# Patient Record
Sex: MALE | Race: WHITE
[De-identification: names, ages, dates, MRNs, and addresses within clinical notes are randomized per-mention and may not be internally consistent; named-entity substitution may affect disease eponyms.]

---

## 2017-12-29 ENCOUNTER — HOSPITAL ENCOUNTER (EMERGENCY)
Dept: HOSPITAL 96 - M.ERS | Age: 77
Discharge: HOME | End: 2017-12-29
Payer: COMMERCIAL

## 2017-12-29 VITALS — BODY MASS INDEX: 33.33 KG/M2 | WEIGHT: 225 LBS | HEIGHT: 69 IN

## 2017-12-29 VITALS — DIASTOLIC BLOOD PRESSURE: 91 MMHG | SYSTOLIC BLOOD PRESSURE: 180 MMHG

## 2017-12-29 DIAGNOSIS — E78.00: ICD-10-CM

## 2017-12-29 DIAGNOSIS — E11.9: ICD-10-CM

## 2017-12-29 DIAGNOSIS — R42: Primary | ICD-10-CM

## 2017-12-29 LAB
ABSOLUTE BASOPHILS: 0 THOU/UL (ref 0–0.2)
ABSOLUTE EOSINOPHILS: 0.2 THOU/UL (ref 0–0.7)
ABSOLUTE MONOCYTES: 0.5 THOU/UL (ref 0–1.2)
ALBUMIN SERPL-MCNC: 3.9 G/DL (ref 3.4–5)
ALP SERPL-CCNC: 87 U/L (ref 46–116)
ALT SERPL-CCNC: 28 U/L (ref 30–65)
ANION GAP SERPL CALC-SCNC: 9 MMOL/L (ref 7–16)
AST SERPL-CCNC: 19 U/L (ref 15–37)
BASOPHILS NFR BLD AUTO: 0.5 %
BILIRUB SERPL-MCNC: 0.7 MG/DL
BUN SERPL-MCNC: 18 MG/DL (ref 7–18)
CALCIUM SERPL-MCNC: 9.2 MG/DL (ref 8.5–10.1)
CHLORIDE SERPL-SCNC: 99 MMOL/L (ref 98–107)
CO2 SERPL-SCNC: 27 MMOL/L (ref 21–32)
CREAT SERPL-MCNC: 1 MG/DL (ref 0.6–1.3)
EOSINOPHIL NFR BLD: 2.1 %
GLUCOSE SERPL-MCNC: 227 MG/DL (ref 70–99)
GRANULOCYTES NFR BLD MANUAL: 78.3 %
HCT VFR BLD CALC: 41.8 % (ref 42–52)
HGB BLD-MCNC: 14.5 GM/DL (ref 14–18)
LYMPHOCYTES # BLD: 0.9 THOU/UL (ref 0.8–5.3)
LYMPHOCYTES NFR BLD AUTO: 12.2 %
MCH RBC QN AUTO: 30.5 PG (ref 26–34)
MCHC RBC AUTO-ENTMCNC: 34.6 G/DL (ref 28–37)
MCV RBC: 88.1 FL (ref 80–100)
MONOCYTES NFR BLD: 6.9 %
MPV: 7.3 FL. (ref 7.2–11.1)
NEUTROPHILS # BLD: 5.7 THOU/UL (ref 1.6–8.1)
NUCLEATED RBCS: 0 /100WBC
PLATELET COUNT*: 215 THOU/UL (ref 150–400)
POTASSIUM SERPL-SCNC: 4.2 MMOL/L (ref 3.5–5.1)
PROT SERPL-MCNC: 7.2 G/DL (ref 6.4–8.2)
RBC # BLD AUTO: 4.75 MIL/UL (ref 4.5–6)
RDW-CV: 12.7 % (ref 10.5–14.5)
SODIUM SERPL-SCNC: 135 MMOL/L (ref 136–145)
TROPONIN-I LEVEL: <0.06 NG/ML (ref ?–0.06)
WBC # BLD AUTO: 7.3 THOU/UL (ref 4–11)

## 2018-02-06 ENCOUNTER — HOSPITAL ENCOUNTER (EMERGENCY)
Dept: HOSPITAL 96 - M.ERS | Age: 78
Discharge: HOME | End: 2018-02-06
Payer: COMMERCIAL

## 2018-02-06 VITALS — SYSTOLIC BLOOD PRESSURE: 178 MMHG | DIASTOLIC BLOOD PRESSURE: 70 MMHG

## 2018-02-06 VITALS — HEIGHT: 69 IN | WEIGHT: 214 LBS | BODY MASS INDEX: 31.7 KG/M2

## 2018-02-06 DIAGNOSIS — R42: Primary | ICD-10-CM

## 2018-02-06 DIAGNOSIS — E78.00: ICD-10-CM

## 2018-02-06 DIAGNOSIS — E11.9: ICD-10-CM

## 2018-02-06 DIAGNOSIS — Z86.73: ICD-10-CM

## 2018-02-06 DIAGNOSIS — Z88.2: ICD-10-CM

## 2018-02-06 LAB
ABSOLUTE BASOPHILS: 0 THOU/UL (ref 0–0.2)
ABSOLUTE EOSINOPHILS: 0.2 THOU/UL (ref 0–0.7)
ABSOLUTE MONOCYTES: 0.4 THOU/UL (ref 0–1.2)
ALBUMIN SERPL-MCNC: 3.5 G/DL (ref 3.4–5)
ALP SERPL-CCNC: 82 U/L (ref 46–116)
ALT SERPL-CCNC: 24 U/L (ref 30–65)
ANION GAP SERPL CALC-SCNC: 7 MMOL/L (ref 7–16)
AST SERPL-CCNC: 13 U/L (ref 15–37)
BASOPHILS NFR BLD AUTO: 0.8 %
BILIRUB SERPL-MCNC: 0.4 MG/DL
BILIRUB UR-MCNC: NEGATIVE MG/DL
BUN SERPL-MCNC: 18 MG/DL (ref 7–18)
CALCIUM SERPL-MCNC: 9.1 MG/DL (ref 8.5–10.1)
CHLORIDE SERPL-SCNC: 103 MMOL/L (ref 98–107)
CO2 SERPL-SCNC: 26 MMOL/L (ref 21–32)
COLOR UR: (no result)
CREAT SERPL-MCNC: 1 MG/DL (ref 0.6–1.3)
EOSINOPHIL NFR BLD: 3.5 %
GLUCOSE SERPL-MCNC: 245 MG/DL (ref 70–99)
GRANULOCYTES NFR BLD MANUAL: 78.6 %
HCT VFR BLD CALC: 40 % (ref 42–52)
HGB BLD-MCNC: 13.9 GM/DL (ref 14–18)
INR PPP: 1
KETONES UR STRIP-MCNC: NEGATIVE MG/DL
LIPASE: 260 U/L (ref 73–393)
LYMPHOCYTES # BLD: 0.5 THOU/UL (ref 0.8–5.3)
LYMPHOCYTES NFR BLD AUTO: 10 %
MCH RBC QN AUTO: 30.5 PG (ref 26–34)
MCHC RBC AUTO-ENTMCNC: 34.7 G/DL (ref 28–37)
MCV RBC: 88 FL (ref 80–100)
MONOCYTES NFR BLD: 7.1 %
MPV: 7.3 FL. (ref 7.2–11.1)
NEUTROPHILS # BLD: 4.1 THOU/UL (ref 1.6–8.1)
NT-PRO BRAIN NAT PEPTIDE: 186 PG/ML (ref ?–300)
NUCLEATED RBCS: 0 /100WBC
PLATELET COUNT*: 197 THOU/UL (ref 150–400)
POTASSIUM SERPL-SCNC: 4.6 MMOL/L (ref 3.5–5.1)
PROT SERPL-MCNC: 6.6 G/DL (ref 6.4–8.2)
PROT UR QL STRIP: NEGATIVE
PROTHROMBIN TIME: 10.2 SECONDS (ref 9.2–11.5)
RBC # BLD AUTO: 4.55 MIL/UL (ref 4.5–6)
RBC # UR STRIP: NEGATIVE /UL
RDW-CV: 13.3 % (ref 10.5–14.5)
SODIUM SERPL-SCNC: 136 MMOL/L (ref 136–145)
SP GR UR STRIP: 1.01 (ref 1–1.03)
TROPONIN-I LEVEL: <0.06 NG/ML (ref ?–0.06)
URINE CLARITY: CLEAR
URINE GLUCOSE-RANDOM: (no result)
URINE LEUKOCYTES-REFLEX: NEGATIVE
URINE NITRITE-REFLEX: NEGATIVE
UROBILINOGEN UR STRIP-ACNC: 0.2 E.U./DL (ref 0.2–1)
WBC # BLD AUTO: 5.2 THOU/UL (ref 4–11)

## 2018-02-06 NOTE — EKG
[place] [address]
Phone:  [phone]                     ELECTROCARDIOGRAM REPORT      
_______________________________________________________________________________
 
Name:       ANALIANANCY ROMERO          Room:                      REG ER  
M.R.#:  [id number]      Account #:      [id number]  
Admission:  18     Attend Phys:                         
Discharge:               Date of Birth:  [birth year]  
         Report #: [id number]
    [id number]
_______________________________________________________________________________
THIS REPORT FOR:  //name//                      
 
                         [facility] ED
                                       
Test Date:    2018               Test Time:    10:11:00
Pat Name:     NANCY HELMS      Department:   
Patient ID:   [id number]            Room:          
Gender:       M                        Technician:   MS
:          1940               Requested By: Tae Esquivel
Order Number: [id number] MD:   Ronald Cameron
                                 Measurements
Intervals                              Axis          
Rate:         60                       P:            48
NY:           195                      QRS:          6
QRSD:         93                       T:            37
QT:           410                                    
QTc:          410                                    
                           Interpretive Statements
Sinus rhythm
Compared to ECG 2018 14:10:28
No significant changes
 
Electronically Signed On 2018 16:33:32 CST by Ronald Cameron
[email or web address]
 
 
 
 
 
 
 
 
 
 
 
 
 
 
 
 
 
 
 
  <ELECTRONICALLY SIGNED>
                                           By: Ronald Cameron MD, [facility]   
  18     1633
D: 18 1011   _____________________________________
T: 18 1011   Ronald Cameron MD, FACC     /EPI

## 2018-02-08 ENCOUNTER — HOSPITAL ENCOUNTER (INPATIENT)
Dept: HOSPITAL 96 - M.ERS | Age: 78
LOS: 4 days | Discharge: HOME | DRG: 74 | End: 2018-02-12
Attending: INTERNAL MEDICINE | Admitting: INTERNAL MEDICINE
Payer: COMMERCIAL

## 2018-02-08 VITALS — BODY MASS INDEX: 17.48 KG/M2 | HEIGHT: 69.02 IN | WEIGHT: 118 LBS

## 2018-02-08 VITALS — SYSTOLIC BLOOD PRESSURE: 184 MMHG | DIASTOLIC BLOOD PRESSURE: 93 MMHG

## 2018-02-08 VITALS — DIASTOLIC BLOOD PRESSURE: 84 MMHG | SYSTOLIC BLOOD PRESSURE: 167 MMHG

## 2018-02-08 VITALS — SYSTOLIC BLOOD PRESSURE: 128 MMHG | DIASTOLIC BLOOD PRESSURE: 70 MMHG

## 2018-02-08 VITALS — DIASTOLIC BLOOD PRESSURE: 60 MMHG | SYSTOLIC BLOOD PRESSURE: 145 MMHG

## 2018-02-08 VITALS — DIASTOLIC BLOOD PRESSURE: 66 MMHG | SYSTOLIC BLOOD PRESSURE: 130 MMHG

## 2018-02-08 VITALS — DIASTOLIC BLOOD PRESSURE: 79 MMHG | SYSTOLIC BLOOD PRESSURE: 163 MMHG

## 2018-02-08 VITALS — DIASTOLIC BLOOD PRESSURE: 73 MMHG | SYSTOLIC BLOOD PRESSURE: 122 MMHG

## 2018-02-08 DIAGNOSIS — Z79.84: ICD-10-CM

## 2018-02-08 DIAGNOSIS — I10: ICD-10-CM

## 2018-02-08 DIAGNOSIS — E78.00: ICD-10-CM

## 2018-02-08 DIAGNOSIS — Z88.2: ICD-10-CM

## 2018-02-08 DIAGNOSIS — H81.399: ICD-10-CM

## 2018-02-08 DIAGNOSIS — Z79.899: ICD-10-CM

## 2018-02-08 DIAGNOSIS — E44.1: ICD-10-CM

## 2018-02-08 DIAGNOSIS — E11.43: Primary | ICD-10-CM

## 2018-02-08 DIAGNOSIS — E11.65: ICD-10-CM

## 2018-02-08 DIAGNOSIS — Z79.82: ICD-10-CM

## 2018-02-08 DIAGNOSIS — Z87.891: ICD-10-CM

## 2018-02-08 DIAGNOSIS — Z85.46: ICD-10-CM

## 2018-02-08 LAB
ABSOLUTE BASOPHILS: 0 THOU/UL (ref 0–0.2)
ABSOLUTE EOSINOPHILS: 0.2 THOU/UL (ref 0–0.7)
ABSOLUTE MONOCYTES: 0.4 THOU/UL (ref 0–1.2)
ALBUMIN SERPL-MCNC: 3.6 G/DL (ref 3.4–5)
ALP SERPL-CCNC: 81 U/L (ref 46–116)
ALT SERPL-CCNC: 23 U/L (ref 30–65)
ANION GAP SERPL CALC-SCNC: 8 MMOL/L (ref 7–16)
APTT BLD: 26.1 SECONDS (ref 25–31.3)
AST SERPL-CCNC: 11 U/L (ref 15–37)
BASOPHILS NFR BLD AUTO: 0.8 %
BILIRUB SERPL-MCNC: 0.5 MG/DL
BILIRUB UR-MCNC: NEGATIVE MG/DL
BUN SERPL-MCNC: 25 MG/DL (ref 7–18)
CALCIUM SERPL-MCNC: 9.3 MG/DL (ref 8.5–10.1)
CHLORIDE SERPL-SCNC: 100 MMOL/L (ref 98–107)
CO2 SERPL-SCNC: 28 MMOL/L (ref 21–32)
COLOR UR: YELLOW
CREAT SERPL-MCNC: 1.1 MG/DL (ref 0.6–1.3)
EOSINOPHIL NFR BLD: 4.8 %
GLUCOSE SERPL-MCNC: 300 MG/DL (ref 70–99)
GRANULOCYTES NFR BLD MANUAL: 70.4 %
HCT VFR BLD CALC: 40.3 % (ref 42–52)
HGB BLD-MCNC: 13.7 GM/DL (ref 14–18)
INR PPP: 1.1
KETONES UR STRIP-MCNC: NEGATIVE MG/DL
LYMPHOCYTES # BLD: 0.8 THOU/UL (ref 0.8–5.3)
LYMPHOCYTES NFR BLD AUTO: 15.4 %
MCH RBC QN AUTO: 30 PG (ref 26–34)
MCHC RBC AUTO-ENTMCNC: 34.1 G/DL (ref 28–37)
MCV RBC: 88.1 FL (ref 80–100)
MONOCYTES NFR BLD: 8.6 %
MPV: 7.1 FL. (ref 7.2–11.1)
NEUTROPHILS # BLD: 3.6 THOU/UL (ref 1.6–8.1)
NT-PRO BRAIN NAT PEPTIDE: 132 PG/ML (ref ?–300)
NUCLEATED RBCS: 0 /100WBC
PLATELET COUNT*: 208 THOU/UL (ref 150–400)
POTASSIUM SERPL-SCNC: 4.5 MMOL/L (ref 3.5–5.1)
PROT SERPL-MCNC: 6.9 G/DL (ref 6.4–8.2)
PROT UR QL STRIP: NEGATIVE
PROTHROMBIN TIME: 10.3 SECONDS (ref 9.2–11.5)
RBC # BLD AUTO: 4.57 MIL/UL (ref 4.5–6)
RBC # UR STRIP: NEGATIVE /UL
RDW-CV: 13.2 % (ref 10.5–14.5)
SODIUM SERPL-SCNC: 136 MMOL/L (ref 136–145)
SP GR UR STRIP: 1.02 (ref 1–1.03)
TROPONIN-I LEVEL: <0.06 NG/ML (ref ?–0.06)
URINE CLARITY: CLEAR
URINE GLUCOSE-RANDOM: (no result)
URINE LEUKOCYTES-REFLEX: NEGATIVE
URINE NITRITE-REFLEX: NEGATIVE
UROBILINOGEN UR STRIP-ACNC: 0.2 E.U./DL (ref 0.2–1)
WBC # BLD AUTO: 5.1 THOU/UL (ref 4–11)

## 2018-02-08 NOTE — EKG
[place] [address]
Phone:  [phone]                     ELECTROCARDIOGRAM REPORT      
_______________________________________________________________________________
 
Name:       NANCY HELMS          Room:           [address]    ADM [place]  
.R.#:  [id number]      Account #:      [id number]  
Admission:  18     Attend Phys:    Jorden Reyes MD 
Discharge:               Date of Birth:  [birth year]  
         Report #: [id number]
    [id number]
_______________________________________________________________________________
THIS REPORT FOR:  //name//                      
 
                         [facility] ED
                                       
Test Date:    2018               Test Time:    04:19:05
Pat Name:     NANCY HELMS      Department:   
Patient ID:   [id number]            Room:         [facility]
Gender:                               Technician:   VINCE RAMIREZ
:          1940               Requested By: Jb Caicedo
Order Number: [id number] MD:   Ronald Cameron
                                 Measurements
Intervals                              Axis          
Rate:         59                       P:            73
MS:           196                      QRS:          3
QRSD:         95                       T:            31
QT:           426                                    
QTc:          422                                    
                           Interpretive Statements
Sinus rhythm
Low voltage, precordial leads
Compared to ECG 2018 10:11:00
Low QRS voltage now present
 
Electronically Signed On 2018 11:28:50 CST by Ronald Cameron
[email or web address]
 
 
 
 
 
 
 
 
 
 
 
 
 
 
 
 
 
 
  <ELECTRONICALLY SIGNED>
                                           By: Ronald Cameron MD, [facility]   
  18     1128
D: 18 0419   _____________________________________
T: 18 0419   Ronald Cameron MD, [facility]     /EPI

## 2018-02-09 VITALS — SYSTOLIC BLOOD PRESSURE: 126 MMHG | DIASTOLIC BLOOD PRESSURE: 67 MMHG

## 2018-02-09 VITALS — DIASTOLIC BLOOD PRESSURE: 81 MMHG | SYSTOLIC BLOOD PRESSURE: 151 MMHG

## 2018-02-09 VITALS — SYSTOLIC BLOOD PRESSURE: 111 MMHG | DIASTOLIC BLOOD PRESSURE: 52 MMHG

## 2018-02-09 VITALS — SYSTOLIC BLOOD PRESSURE: 123 MMHG | DIASTOLIC BLOOD PRESSURE: 66 MMHG

## 2018-02-09 VITALS — SYSTOLIC BLOOD PRESSURE: 119 MMHG | DIASTOLIC BLOOD PRESSURE: 64 MMHG

## 2018-02-09 LAB
EST. AVERAGE GLUCOSE BLD GHB EST-MCNC: 232 MG/DL
GLYCOHEMOGLOBIN (HGB A1C): 9.7 % (ref 4.8–5.6)

## 2018-02-09 NOTE — CON
[address]  [address]                    CONSULTATION                  
_______________________________________________________________________________
 
Name:       ANALIANANCY ROMERO          Room:           [address] [place]  
.#:  [id number]      Account #:      [id number]  
Admission:  02/08/18     Attend Phys:    Jorden Reyes MD 
Discharge:               Date of Birth:  [birth year]  
         Report #: [id number]
                                                                     [id number]  
_______________________________________________________________________________
THIS REPORT FOR:  //name//                      
 
CC: ZECHARIAH physician/PCP
    Jorden Reyes
 
DATE OF SERVICE:  02/08/2018
 
 
HISTORY OF PRESENT ILLNESS:  This is a 77-year-old male patient who was seen by
me on his last admission.  This patient had a posterior cerebral artery stroke. 
He was partly worked up by a cardiologist and was going to be worked up as an
outpatient, but I do not know whether he followed up with them or not.  He has
this history of vertigo for some time.  This predated his stroke.  He had two
episodes of pretty severe vertigo.  It was associated with nausea.  He did not
have any clear cut seizure activity.
 
REVIEW OF SYSTEMS:  Indicates that this patient's dizziness predate this
patient's stroke.  This patient also has a prior tumor that has been present on
his imaging studies.  He has not had any ENT evaluation.  He was going to go to
an ophthalmologist, but he has not been there yet.  This was his relevant
14-point review of systems.
 
PAST MEDICAL HISTORY:  Positive for stroke, but his dizziness predated that.
 
FAMILY HISTORY:  Noncontributory.
 
SOCIAL HISTORY:  He does not smoke.
 
PHYSICAL EXAMINATION:  
NEUROLOGICAL:  The patient's examination was carried out.  His neurological
examination is that alert, responsive, able to follow simple and complex
command.  His speech, concentration, fund of knowledge and memory is at his
baseline.  Cranial nerve examination 2 through 12 still indicate hemianopsia. 
He moves symmetrically.  There is no meningeal sign.  
NECK:  There is no carotid bruit.  
CARDIOVASCULAR:  No atrial fibrillation has been documented.  
LUNGS:  No respiratory difficulty or rhonchi.  
VITAL SIGNS:  This patient's blood pressure is 184/93, respirations 18, pulse is
63 and temperature is 98.1.
EXTREMITIES:  There is no edema or cyanosis.  
 
LABORATORY DATA:  His white count is 5.1.  
 
RADIOLOGICAL DATA:  His imaging study was reviewed and he had a CT and MRI and
that showed pretty much unchanged.
 
 
 
 
[place] [address]                    CONSULTATION                  
_______________________________________________________________________________
 
Name:       MARY ANNPHILIPPCHAUNCEYNANCY HEATHER          Room:           [address]#:  [id number]      Account #:      [id number]  
Admission:  02/08/18     Attend Phys:    Jorden Reyes MD 
Discharge:               Date of Birth:  [birth year]  
         Report #: [id number]
                                                                     [id number]  
_______________________________________________________________________________
IMPRESSION:
1.  It is unlikely that the patient's dizziness is associated with present
symptomatology.  I think we need to look for the other causes.  He needs an ENT
evaluation and he also needs cardiac evaluation.
2.  He is a diabetic, so he can get autonomic neuropathy and his diabetes does
not appear to be well controlled, but the symptoms are not very typical for
that.
3.  He does have tumor.  He can develop seizures and that is a possible that he
can have a vertiginous seizure.
 
RECOMMENDATIONS:  So far, the workup has been non-rewarding in this patient, I
am going to go ahead and do an EEG in this patient.  He just had a CT chest
protocol, so I do not think we should do CT angio of the head and neck.  I will
check an EEG, but tried to get, I think we should give him a trial with
Lamictal.  He should have Cardiology followup and should also have an ENT
followup because I do not think any ENT physician comes here anymore.
 
More than 50 minutes of time was spent taking care of this patient today and
majority of that time was spent counseling this patient on above matters.  Dr. Fisher will follow up this patient with you from tomorrow.
 
 
 
 
 
 
 
 
 
 
 
 
 
 
 
 
 
 
 
 
 
 
 
 
<ELECTRONICALLY SIGNED>
                                        By:  Bill Persaud MD             
02/09/18     1039
D: 02/08/18 1629_______________________________________
T: 02/09/18 0256Bill Persaud MD                /nt

## 2018-02-10 VITALS — DIASTOLIC BLOOD PRESSURE: 57 MMHG | SYSTOLIC BLOOD PRESSURE: 114 MMHG

## 2018-02-10 VITALS — SYSTOLIC BLOOD PRESSURE: 130 MMHG | DIASTOLIC BLOOD PRESSURE: 80 MMHG

## 2018-02-10 VITALS — DIASTOLIC BLOOD PRESSURE: 67 MMHG | SYSTOLIC BLOOD PRESSURE: 143 MMHG

## 2018-02-10 VITALS — DIASTOLIC BLOOD PRESSURE: 69 MMHG | SYSTOLIC BLOOD PRESSURE: 122 MMHG

## 2018-02-10 VITALS — DIASTOLIC BLOOD PRESSURE: 71 MMHG | SYSTOLIC BLOOD PRESSURE: 128 MMHG

## 2018-02-10 LAB
ANION GAP SERPL CALC-SCNC: 8 MMOL/L (ref 7–16)
BUN SERPL-MCNC: 26 MG/DL (ref 7–18)
CALCIUM SERPL-MCNC: 9.1 MG/DL (ref 8.5–10.1)
CHLORIDE SERPL-SCNC: 104 MMOL/L (ref 98–107)
CO2 SERPL-SCNC: 27 MMOL/L (ref 21–32)
CREAT SERPL-MCNC: 1.1 MG/DL (ref 0.6–1.3)
GLUCOSE SERPL-MCNC: 158 MG/DL (ref 70–99)
HCT VFR BLD CALC: 38.5 % (ref 42–52)
HGB BLD-MCNC: 13.1 GM/DL (ref 14–18)
MAGNESIUM SERPL-MCNC: 1.8 MG/DL (ref 1.8–2.4)
MCH RBC QN AUTO: 30 PG (ref 26–34)
MCHC RBC AUTO-ENTMCNC: 34 G/DL (ref 28–37)
MCV RBC: 88.3 FL (ref 80–100)
MPV: 7.3 FL. (ref 7.2–11.1)
PLATELET COUNT*: 207 THOU/UL (ref 150–400)
POTASSIUM SERPL-SCNC: 4.3 MMOL/L (ref 3.5–5.1)
RBC # BLD AUTO: 4.36 MIL/UL (ref 4.5–6)
RDW-CV: 13.3 % (ref 10.5–14.5)
SODIUM SERPL-SCNC: 139 MMOL/L (ref 136–145)
WBC # BLD AUTO: 5.2 THOU/UL (ref 4–11)

## 2018-02-10 NOTE — CON
[address]  [address]                    CONSULTATION                  
_______________________________________________________________________________
 
Name:       ANALIANANCY HEATHER          Room:           [address] [place]  
M.R.#:  [id number]      Account #:      [id number]  
Admission:  02/08/18     Attend Phys:    Jorden Reyes MD 
Discharge:               Date of Birth:  [birth year]  
         Report #: [id number]
                                                                     [id number]  
_______________________________________________________________________________
THIS REPORT FOR:  //name//                      
 
CC: Reji Cloud DO
    [facility] physician/PCP
    Jorden Reyes
 
DATE OF SERVICE:  02/09/2018
 
 
TYPE OF REPORT:  Cardiology consultation
 
HISTORY OF PRESENT ILLNESS:  The patient is a 77-year-old single white male who
I was asked to see in the hospital today after he complained of being dizzy. 
The patient has had several hospitalizations here at [place].  He was
actually admitted here to [place] back in 2014 and seen by Dr. Judge.  He
complained of being short of breath.  He underwent a nuclear stress test at that
time.  There was a perfusion defect of the anterior wall, partially reversible. 
Ejection fraction is 65%.  This is felt to represent apical thinning versus
partial thickness infarction.  Ejection fraction 65%.  Medical therapy was
recommended.  He had an echocardiogram at that time that showed ejection
fraction 55%.  It was recommended that he be treated medically.  The patient was
just admitted here to [place] a month ago complaining of lightheadedness and
dizziness.  He was seen at that time by my partner, Dr. Lyons.  He was felt to
have had a stroke.  Dr. Lyons performed a ABHAY on January 10th that showed left
ventricular hypertrophy, ejection fraction 60% and no PFO.  He was discharged
with a diagnosis of a stroke.  His dizziness improved.  He was discharged on
aspirin 325 mg a day.  The patient returned to the Emergency Room last night
complaining of dizziness.  He had problems with balance.  Denied any chest pain,
shortness of breath or palpitations.  He was admitted to a monitored bed. 
Cardiology consultation was requested.
 
PAST MEDICAL HISTORY:  Otherwise significant for knee surgery, hypertension,
diabetes and hyperlipidemia.
 
MEDICATIONS:  Include amlodipine, aspirin, lisinopril, metformin, simvastatin
and Januvia.
 
ALLERGIES:  He has an allergy to SULFA DRUGS.
 
FAMILY HISTORY:  Negative for heart disease.
 
SOCIAL HISTORY:  He has been  twice.  He still works in the [employer].  Quit smoking years ago.  No alcohol abuse.
 
REVIEW OF SYSTEMS:  He has had no history of asthma, peptic ulcer disease.  He
has a history of prostate cancer.  No psychiatric illness.  Does wear glasses.
 
 
 
[place] [address]                    CONSULTATION                  
_______________________________________________________________________________
 
Name:       NANCY HELMS          Room:           [address]#:  [id number]      Account #:      [id number]  
Admission:  02/08/18     Attend Phys:    Jorden Reyes MD 
Discharge:               Date of Birth:  [birth year]  
         Report #: [id number]
                                                                     [id number]  
_______________________________________________________________________________
 
PHYSICAL EXAMINATION:
GENERAL:  Revealed an elderly male, lying in bed, appears in no acute distress.
VITAL SIGNS:  Reveal a blood pressure of following:  He had 130/60, pulse 60 and
he is afebrile.
HEENT:  He is anicteric.  Conjunctivae pink.  Mucous membranes moist.
NECK:  Veins nondistended.  No carotid bruits.
CHEST:  Clear to auscultation.
CARDIAC:  Regular rate and rhythm.
ABDOMEN:  Soft and nontender.
EXTREMITIES:  Had no edema.  Dorsalis pedis pulse 1+ bilaterally.
SKIN:  Warm and dry.
NEUROLOGICAL:  Nonfocal.
LYMPHATIC:  No adenopathy.
MUSCULOSKELETAL:  No joint effusion.
 
RADIOLOGICAL DATA:  His ECG shows a sinus rhythm.  There is no ST or T-wave
change.
 
LABORATORY DATA:  Sodium 136, creatinine 1.1 and glucose 300.  Troponin 0.06. 
Cholesterol 173, triglyceride 127, HDL 45 and .  TSH 1.3.  White blood
cell count 5.1 and hemoglobin 13.7.
 
IMPRESSION AND RECOMMENDATIONS:
1.  Vertigo.  The patient seen by Neurology.
2.  Recent stroke.  The patient does take an aspirin a day.
3.  Hypertension.  The patient is on angiotensin-converting enzyme inhibitor and
calcium blocker.
4.  Diabetes.
5.  Hyperlipidemia.  The patient is on a statin drug.
6.  History of prostate cancer.  Recommend no further cardiac evaluation. 
Cardiology will sign off at this time.  No cardiac followup required.
 
 
 
 
 
 
 
 
 
 
 
 
<ELECTRONICALLY SIGNED>
                                        By:  Byron Bashir MD, FACC      
02/10/18     1328
D: 02/09/18 1546_______________________________________
T: 02/09/18 2016Byron Bashir MD, FACC         /nt

## 2018-02-11 VITALS — SYSTOLIC BLOOD PRESSURE: 124 MMHG | DIASTOLIC BLOOD PRESSURE: 61 MMHG

## 2018-02-11 VITALS — DIASTOLIC BLOOD PRESSURE: 64 MMHG | SYSTOLIC BLOOD PRESSURE: 120 MMHG

## 2018-02-11 VITALS — DIASTOLIC BLOOD PRESSURE: 67 MMHG | SYSTOLIC BLOOD PRESSURE: 135 MMHG

## 2018-02-11 VITALS — SYSTOLIC BLOOD PRESSURE: 145 MMHG | DIASTOLIC BLOOD PRESSURE: 75 MMHG

## 2018-02-11 VITALS — DIASTOLIC BLOOD PRESSURE: 61 MMHG | SYSTOLIC BLOOD PRESSURE: 121 MMHG

## 2018-02-12 VITALS — SYSTOLIC BLOOD PRESSURE: 116 MMHG | DIASTOLIC BLOOD PRESSURE: 72 MMHG

## 2018-02-12 VITALS — SYSTOLIC BLOOD PRESSURE: 123 MMHG | DIASTOLIC BLOOD PRESSURE: 65 MMHG

## 2018-02-12 VITALS — DIASTOLIC BLOOD PRESSURE: 57 MMHG | SYSTOLIC BLOOD PRESSURE: 100 MMHG

## 2018-02-12 VITALS — SYSTOLIC BLOOD PRESSURE: 116 MMHG | DIASTOLIC BLOOD PRESSURE: 57 MMHG

## 2018-03-04 ENCOUNTER — HOSPITAL ENCOUNTER (EMERGENCY)
Dept: HOSPITAL 96 - M.ERS | Age: 78
Discharge: HOME | End: 2018-03-04
Payer: COMMERCIAL

## 2018-03-04 VITALS — HEIGHT: 69 IN | BODY MASS INDEX: 31.7 KG/M2 | WEIGHT: 214 LBS

## 2018-03-04 VITALS — SYSTOLIC BLOOD PRESSURE: 148 MMHG | DIASTOLIC BLOOD PRESSURE: 70 MMHG

## 2018-03-04 DIAGNOSIS — Z86.73: ICD-10-CM

## 2018-03-04 DIAGNOSIS — Z88.2: ICD-10-CM

## 2018-03-04 DIAGNOSIS — R42: ICD-10-CM

## 2018-03-04 DIAGNOSIS — E11.9: ICD-10-CM

## 2018-03-04 DIAGNOSIS — H65.93: Primary | ICD-10-CM

## 2018-03-04 DIAGNOSIS — E78.00: ICD-10-CM

## 2021-02-25 NOTE — EKG
[place] [address]
Phone:  [phone]                     ELECTROCARDIOGRAM REPORT      
_______________________________________________________________________________
 
Name:       NOEL HELMS HEATHER             Room:                      [facility]#:  [id number]      Account #:      [id number]  
Admission:  17     Attend Phys:                         
Discharge:  17     Date of Birth:  [birth year]  
         Report #: [id number]
    [id number]
_______________________________________________________________________________
THIS REPORT FOR:  //name//                      
 
                         [facility] ED
                                       
Test Date:    2017               Test Time:    16:34:51
Pat Name:     NOEL HELMS         Department:   
Patient ID:   [id number]            Room:          
Gender:       M                        Technician:   
:          1940               Requested By: Narciso Chiang
Order Number: [id number] MD:   Sriram Amaro
                                 Measurements
Intervals                              Axis          
Rate:         70                       P:            52
NY:           191                      QRS:          1
QRSD:         92                       T:            18
QT:           403                                    
QTc:          435                                    
                           Interpretive Statements
Sinus rhythm
Baseline wander in lead(s) III
Compared to ECG 2014 08:17:13
No significant changes
 
Electronically Signed On 2017 10:19:51 CST by Sriram Amaro
[email or web address]
 
 
 
 
 
 
 
 
 
 
 
 
 
 
 
 
 
 
  <ELECTRONICALLY SIGNED>
                                           By: Sriram Amaro MD, [facility]     
  17     1019
D: 17 1634   _____________________________________
T: 17 163   Sriram Amaro MD, FACC       /EPI
Yes